# Patient Record
Sex: MALE | Race: WHITE | URBAN - METROPOLITAN AREA
[De-identification: names, ages, dates, MRNs, and addresses within clinical notes are randomized per-mention and may not be internally consistent; named-entity substitution may affect disease eponyms.]

---

## 2017-11-09 ENCOUNTER — ALLIED HEALTH/NURSE VISIT (OUTPATIENT)
Dept: NEUROLOGY | Facility: CLINIC | Age: 37
End: 2017-11-09

## 2017-11-09 DIAGNOSIS — G40.909 SEIZURE DISORDER (H): Primary | ICD-10-CM

## 2017-11-09 NOTE — MR AVS SNAPSHOT
After Visit Summary   2017    Negro Haddad    MRN: [id number]           Patient Information     Date Of Birth          1980        Visit Information        Provider Department      2017 9:30 AM [facility] EEG 3 [facility]        Today's Diagnoses     Seizure disorder (H)    -  1       Follow-ups after your visit        Your next 10 appointments already scheduled     Nov 10, 2017  8:00 AM CST   [facility] ([facility])    [address], [address]  [facility] [address] [phone]              Who to contact     Please call your clinic at [phone] to:    Ask questions about your health    Make or cancel appointments    Discuss your medicines    Learn about your test results    Speak to your doctor   If you have compliments or concerns about an experience at your clinic, or if you wish to file a complaint, please contact [facility] Patient Relations at [phone] or email us at [email or web address]         Additional Information About Your Visit        MyChart Information     [employer] is an electronic gateway that provides easy, online access to your medical records. With [employer], you can request a clinic appointment, read your test results, renew a prescription or communicate with your care team.     To sign up for [employer] visit the website at [email or web address]   You will be asked to enter the access code listed below, as well as some personal information. Please follow the directions to create your username and password.     Your access code is: [email or web address]  Expires: 2018  2:19 PM     Your access code will  in 90 days. If you need help or a new code, please contact your [facility] or call [phone] for assistance.        Care EveryWhere ID     This is your Care EveryWhere ID. This could be used by other  organizations to access your [place] medical records  [id number]         Blood Pressure from Last 3 Encounters:   No data found for BP    Weight from Last 3 Encounters:   No data found for Wt              We Performed the Following     CHARGE: Video EEG  < 12 hours ([id number])     ORDER:  EEG video monitoring        Primary Care Provider    Naif Bella       No address on file        Equal Access to Services     CODY BRANDKATIA : Hadii aad ku hadasho Soomaali, waaxda luqadaha, qaybta kaalmada adeegyada, celeste saldana andrzejn adejocelyn foydeniseruby gramajo . So [facility] [phone].    ATENCIÓN: Si habla español, tiene a galindo disposición servicios gratuitos de asistencia lingüística. Llame al [phone].    We comply with applicable federal civil rights laws and [place] laws. We do not discriminate on the basis of race, color, national origin, age, disability, sex, sexual orientation, or gender identity.            Thank you!     Thank you for choosing [facility] EPILEPSY [facility]  for your care. Our goal is always to provide you with excellent care. Hearing back from our patients is one way we can continue to improve our services. Please take a few minutes to complete the written survey that you may receive in the mail after your visit with us. Thank you!             Your Updated Medication List - Protect others around you: Learn how to safely use, store and throw away your medicines at www.disposemymeds.org.      Notice  As of 11/9/2017  2:19 PM    You have not been prescribed any medications.

## 2017-11-09 NOTE — PROGRESS NOTES
[facility] [id number]  OP/3hr Video EEG  [facility]  Dr. Susan hernandez

## 2017-11-10 ENCOUNTER — OFFICE VISIT (OUTPATIENT)
Dept: NEUROLOGY | Facility: CLINIC | Age: 37
End: 2017-11-10

## 2017-11-10 VITALS
WEIGHT: 203.4 LBS | HEIGHT: 73 IN | BODY MASS INDEX: 26.96 KG/M2 | HEART RATE: 45 BPM | SYSTOLIC BLOOD PRESSURE: 119 MMHG | DIASTOLIC BLOOD PRESSURE: 68 MMHG

## 2017-11-10 DIAGNOSIS — F41.9 ANXIETY: Primary | ICD-10-CM

## 2017-11-10 DIAGNOSIS — G40.319 GENERALIZED CONVULSIVE EPILEPSY WITH INTRACTABLE EPILEPSY (H): ICD-10-CM

## 2017-11-10 RX ORDER — LACOSAMIDE 50 MG/1
150 TABLET ORAL 2 TIMES DAILY
Qty: 60 TABLET | Status: CANCELLED | OUTPATIENT
Start: 2017-11-10

## 2017-11-10 RX ORDER — LACOSAMIDE 100 MG/1
TABLET ORAL
Qty: 120 TABLET | Refills: 3 | Status: SHIPPED | OUTPATIENT
Start: 2017-11-10 | End: 2018-04-15

## 2017-11-10 RX ORDER — LACOSAMIDE 50 MG/1
50 TABLET ORAL 2 TIMES DAILY
COMMUNITY

## 2017-11-10 RX ORDER — LORAZEPAM 1 MG/1
1 TABLET ORAL DAILY PRN
Qty: 30 TABLET | Refills: 3 | Status: SHIPPED | OUTPATIENT
Start: 2017-11-10

## 2017-11-10 NOTE — MR AVS SNAPSHOT
After Visit Summary   11/10/2017    Negro Haddad    MRN: [id number]           Patient Information     Date Of Birth          1980        Visit Information        Provider Department      11/10/2017 8:00 AM Sofi Davis MD [facility]        Care Instructions    Vimpat 150 mg am 200 mg Pm for 2 weeks, then 200 each night  Lorazepam 1 mg , bite and suck as needed for sleep and anxiety.          Follow-ups after your visit        Your next 10 appointments already scheduled     Dec 12, 2017  4:30 PM CST   Telephone Call with MD PHIL [facility] Ep[facility] ([facility])    [address], [address]  [facility] [address] [phone]           Note: This is not an onsite visit; there is no need to come to the facility.              Who to contact     Please call your clinic at [phone] to:    Ask questions about your health    Make or cancel appointments    Discuss your medicines    Learn about your test results    Speak to your doctor   If you have compliments or concerns about an experience at your clinic, or if you wish to file a complaint, please contact [facility] Patient Relations at [phone] or email us at [email or web address]         Additional Information About Your Visit        MyChart Information     [employer] is an electronic gateway that provides easy, online access to your medical records. With [employer], you can request a clinic appointment, read your test results, renew a prescription or communicate with your care team.     To sign up for [employer] visit the website at [email or web address]   You will be asked to enter the access code listed below, as well as some personal information. Please follow the directions to create your username and password.     Your access code is: [email or web address]  Expires: 2018  2:19 PM     Your access code will  in 90 days. If you need help or a new code, please  "contact your [facility] or call [phone] for assistance.        Care EveryWhere ID     This is your Care EveryWhere ID. This could be used by other organizations to access your [place] medical records  [id number]        Your Vitals Were     Pulse Height BMI (Body Mass Index)             45 6' 1\" (185.4 cm) 26.84 kg/m2          Blood Pressure from Last 3 Encounters:   11/10/17 119/68    Weight from Last 3 Encounters:   11/10/17 203 lb 6.4 oz (92.3 kg)              Today, you had the following     No orders found for display       Primary Care Provider Fax #    Provider Not In System [phone]                Equal Access to Services     [facility] CenterKATIA : Hadii brenda Turner, waaxda luqadaha, qaybta kaalmada ranulfoyada, celeste gramajo . So [facility] [phone].    ATENCIÓN: Si habla español, tiene a galindo disposición servicios gratuitos de asistencia lingüística. Llame al [phone].    We comply with applicable federal civil rights laws and [place] laws. We do not discriminate on the basis of race, color, national origin, age, disability, sex, sexual orientation, or gender identity.            Thank you!     Thank you for choosing [facility]  for your care. Our goal is always to provide you with excellent care. Hearing back from our patients is one way we can continue to improve our services. Please take a few minutes to complete the written survey that you may receive in the mail after your visit with us. Thank you!             Your Updated Medication List - Protect others around you: Learn how to safely use, store and throw away your medicines at www.disposemymeds.org.          This list is accurate as of: 11/10/17  9:38 AM.  Always use your most recent med list.                   Brand Name Dispense Instructions for use Diagnosis    BELSOMRA 10 MG tablet   Generic drug:  Suvorexant      Take 10 mg by mouth nightly as needed for sleep "        VIMPAT 50 MG Tabs tablet   Generic drug:  lacosamide      Take 50 mg by mouth 2 times daily Take three tablets twice daily (150 mg BID)

## 2017-11-10 NOTE — LETTER
Date:December 7, 2017      Patient was self referred, no letter generated. Do not send.        [facility] Health Information
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
## 2017-11-10 NOTE — PATIENT INSTRUCTIONS
Vimpat 150 mg am 200 mg Pm for 2 weeks, then 200 each night  Lorazepam 1 mg , bite and suck as needed for sleep and anxiety.
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
## 2017-11-13 ENCOUNTER — TELEPHONE (OUTPATIENT)
Dept: NEUROLOGY | Facility: CLINIC | Age: 37
End: 2017-11-13

## 2017-11-13 DIAGNOSIS — Z53.9 ERRONEOUS ENCOUNTER--DISREGARD: Primary | ICD-10-CM
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
## 2017-12-01 ENCOUNTER — TELEPHONE (OUTPATIENT)
Dept: NEUROLOGY | Facility: CLINIC | Age: 37
End: 2017-12-01

## 2017-12-01 NOTE — TELEPHONE ENCOUNTER
Nurse recieved In-Basket message as below:    Nurse called patient's home and did not get an answer early this week, called again left voice mail with call back number and name.

## 2017-12-01 NOTE — TELEPHONE ENCOUNTER
----- Message from Richard Weber RN sent at 11/28/2017  2:44 PM CST -----  Regarding: FW: yandy      ----- Message -----     From: Hoda White LPN     Sent: 11/28/2017  10:12 AM       To: Sofi Davsi MD, Me Radha Clemons Pool  Subject: nful                                             Caller: Dr. Hanh Ewing    Relationship to Patient: PCP    Call Back Number: [phone] fax [phone]    Reason for Call: She would like to see Dr. Davis's notes and the EEG report

## 2017-12-11 NOTE — TELEPHONE ENCOUNTER
Nurse call patient a second and third time, patient called back approximately one week ago and indicated that he had signed a BANG with Dr. Ewing, who he said was going to forward it to us.  Nurse watching for form, which arrived in [facility] this last Friday.  Nurse referring request to Medical Records and closing this encounter.

## 2017-12-12 ENCOUNTER — VIRTUAL VISIT (OUTPATIENT)
Dept: NEUROLOGY | Facility: CLINIC | Age: 37
End: 2017-12-12
Payer: COMMERCIAL

## 2017-12-12 DIAGNOSIS — G40.319 GENERALIZED CONVULSIVE EPILEPSY WITH INTRACTABLE EPILEPSY (H): Primary | ICD-10-CM

## 2017-12-22 NOTE — TELEPHONE ENCOUNTER
This encounter was opened in error. Please disregard.  
Does not need a Prior Auth. Error at the pharmacy. Processes fine now.  
Prior Authorization Retail Medication Request  Medication/Dose: LORazepam (ATIVAN) 1 MG tablet  Diagnosis and ICD code: Anxiety [F41.9]  - Primary   New/Renewal/Insurance Change PA: New  Previously Tried and Failed Therapies: N/A Nothing in chart    Insurance ID (if provided):   Insurance Phone (if provided):     Any additional info from fax request:     If you received a fax notification from an outside Pharmacy:  Pharmacy Name:[facility]  Pharmacy #:[phone]  Pharmacy Fax:[phone]  
TENDERNESS/motor intact/atraumatic/RANGE OF MOTION LIMITED

## 2018-01-12 ENCOUNTER — TELEPHONE (OUTPATIENT)
Dept: NEUROLOGY | Facility: CLINIC | Age: 38
End: 2018-01-12

## 2018-01-12 NOTE — TELEPHONE ENCOUNTER
Chart reviewed. I spoke with Negro by telephone and confirmed the ordered dose to be 200-200. He misunderstood the directions prior and had been taking 250-250. He states he will decrease back to 200-200.      Pt states he has no further questions or concerns.

## 2018-01-12 NOTE — TELEPHONE ENCOUNTER
----- Message from Dalila Dickerson sent at 1/12/2018  2:20 PM CST -----  Regarding: yandy  Caller: Negro    Relationship to Patient: Self    Call Back Number: [phone]    Reason for Call: Please call patient to verify Vimpat dose.  He is unsure of what he is supposed to be taking.    Thanks, Dalila

## 2018-03-23 ENCOUNTER — TELEPHONE (OUTPATIENT)
Dept: NEUROLOGY | Facility: CLINIC | Age: 38
End: 2018-03-23

## 2018-03-23 NOTE — TELEPHONE ENCOUNTER
"Negro calls the clinic today stating he is currently stationed in [place] and had seen a local neurologist. He has experience a \"strange feeling\" of nauseas feeling in his stomach and his head, this feeling goes away on it's own but it is the same feeling each time. 4-5 episodes total since onset in December after his last convulsion with LOC.      The local neurologist diagnosed these episodes as seizures and increased his keppra dosing from 2000 ER to 3000 ER.    Current medications are:    LEV XR 3000 HS. -200.      Patient requests that I notify Dr. Davis to see if he has any advisement on whether this was an appropriate change in his care plan.     PLAN: Routing chart to MD as FYI.  "

## 2018-03-23 NOTE — TELEPHONE ENCOUNTER
----- Message from Francesca Ma CMA sent at 3/23/2018 12:45 PM CDT -----  Regarding: yandy  Caller: Negro    Relationship to Patient: self    Call Back Number: [phone]    Reason for Call: would like to talk about a change in medication.

## 2018-04-15 DIAGNOSIS — G40.319 GENERALIZED CONVULSIVE EPILEPSY WITH INTRACTABLE EPILEPSY (H): ICD-10-CM

## 2018-04-16 DIAGNOSIS — G40.319 GENERALIZED CONVULSIVE EPILEPSY WITH INTRACTABLE EPILEPSY (H): ICD-10-CM

## 2018-04-16 RX ORDER — LACOSAMIDE 100 MG/1
TABLET ORAL
Qty: 122 TABLET | Refills: 1 | Status: SHIPPED | OUTPATIENT
Start: 2018-04-16 | End: 2018-06-05

## 2018-04-17 RX ORDER — LACOSAMIDE 100 MG/1
TABLET, FILM COATED ORAL
Qty: 120 TABLET | Refills: 1 | OUTPATIENT
Start: 2018-04-17

## 2018-06-05 ENCOUNTER — VIRTUAL VISIT (OUTPATIENT)
Dept: NEUROLOGY | Facility: CLINIC | Age: 38
End: 2018-06-05
Payer: COMMERCIAL

## 2018-06-05 DIAGNOSIS — G40.319 GENERALIZED CONVULSIVE EPILEPSY WITH INTRACTABLE EPILEPSY (H): ICD-10-CM

## 2018-06-05 RX ORDER — LACOSAMIDE 100 MG/1
TABLET ORAL
Qty: 360 TABLET | Refills: 2 | Status: SHIPPED | OUTPATIENT
Start: 2018-06-05 | End: 2018-06-15

## 2018-06-05 NOTE — MR AVS SNAPSHOT
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 With PharmMD, you can request a clinic appointment, read your test results, renew a prescription or communicate with your care team.     To sign up for PharmMD visit the website at [email or web address]   You will be asked to enter the access code listed below, as well as some personal information. Please follow the directions to create your username and password.     Your access code is: [email or web address]  Expires: 2018  4:07 PM     Your access code will  in 90 days. If you need help or a new code, please contact your [facility] or call [phone] for assistance.        Care EveryWhere ID     This is your Care EveryWhere ID. This could be used by other organizations to access your [facility] medical records  [id number]         Blood Pressure from Last 3 Encounters:   11/10/17 119/68    Weight from Last 3 Encounters:   11/10/17 203 lb 6.4 oz (92.3 kg)              Today, you had the following     No orders found for display         Where to get your medicines      Some of these will need a paper prescription and others can be bought over the counter.  Ask your nurse if you have questions.     Bring a paper  prescription for each of these medications     Lacosamide 100 MG Tabs tablet          Primary Care Provider Fax #    Provider Not In System [phone]                Equal Access to Services     MI TANG : Hadii aad ku hadjenniferjorge Turner, julesferdinand mosleykeriha, katelynn arturoobdulia guadarrama, celeste mittaljose nevin. So [facility] [phone].    ATENCIÓN: Si habla español, tiene a galindo disposición servicios gratuitos de asistencia lingüística. Llame al [phone].    We comply with applicable federal civil rights laws and [place] laws. We do not discriminate on the basis of race, color, national origin, age, disability, sex, sexual orientation, or gender identity.            Thank you!     Thank you for choosing [facility]  for your care. Our goal is always to provide you with excellent care. Hearing back from our patients is one way we can continue to improve our services. Please take a few minutes to complete the written survey that you may receive in the mail after your visit with us. Thank you!             Your Updated Medication List - Protect others around you: Learn how to safely use, store and throw away your medicines at www.disposemymeds.org.          This list is accurate as of 6/5/18 11:59 PM.  Always use your most recent med list.                   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 Read the directions carefully, and ask your doctor or other care provider to review them with you.

## 2018-06-05 NOTE — PROGRESS NOTES
No seizures; no auras. But local MD says no driving for 6 mo after last aura of abdominal sensation plus slight de ja gi. Also pharmacy not letting him  Vimpat except on day he runs out!!  Plan - contact  Pharmacist.

## 2018-06-15 DIAGNOSIS — G40.319 GENERALIZED CONVULSIVE EPILEPSY WITH INTRACTABLE EPILEPSY (H): ICD-10-CM

## 2018-06-15 RX ORDER — LACOSAMIDE 200 MG/1
200 TABLET ORAL 2 TIMES DAILY
Qty: 62 TABLET | Refills: 5 | Status: SHIPPED | OUTPATIENT
Start: 2018-06-15

## 2018-06-15 NOTE — TELEPHONE ENCOUNTER
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

## 2018-06-18 RX ORDER — LACOSAMIDE 100 MG/1
TABLET, FILM COATED ORAL
Qty: 122 TABLET | Refills: 1 | OUTPATIENT
Start: 2018-06-18

## 2018-06-22 ENCOUNTER — TELEPHONE (OUTPATIENT)
Dept: NEUROLOGY | Facility: CLINIC | Age: 38
End: 2018-06-22

## 2018-06-22 NOTE — TELEPHONE ENCOUNTER
Prior Authorization Retail Medication Request    Medication/Dose: Vimpat  ICD code (if different than what is on RX):    Previously Tried and Failed:  See chart  Rationale:      Insurance Name:    Insurance ID:        Pharmacy Information (if different than what is on RX)  Name:     [id number] [place] [address]      Phone:  [phone]

## 2018-06-25 NOTE — TELEPHONE ENCOUNTER
Insurance does not require PA for medication. Called pharmacy and patient was able to  medication without a PA.

## 2018-07-26 ENCOUNTER — TELEPHONE (OUTPATIENT)
Dept: NEUROLOGY | Facility: CLINIC | Age: 38
End: 2018-07-26

## 2018-07-26 NOTE — TELEPHONE ENCOUNTER
Prior Authorization Retail Medication Request    Medication/Dose: lacosamide (VIMPAT) 200 MG TABS tablet  ICD code (if different than what is on RX):    Previously Tried and Failed:  See chart  Rationale:     Insurance Name:    Insurance ID:        Pharmacy Information (if different than what is on RX)  Name:    Phone:

## 2018-07-31 NOTE — TELEPHONE ENCOUNTER
PA Initiation    Medication: lacosamide (VIMPAT) 200 MG TABS tablet -   Insurance Company: [employer] - Phone [phone] Fax [phone]  Pharmacy Filling the Rx: [id number] - [place] - [address] [place] [address]  Filling Pharmacy Phone: [phone]  Filling Pharmacy Fax: [phone]  Start Date: 7/31/2018